# Patient Record
Sex: MALE | Race: WHITE | NOT HISPANIC OR LATINO | Employment: FULL TIME | URBAN - METROPOLITAN AREA
[De-identification: names, ages, dates, MRNs, and addresses within clinical notes are randomized per-mention and may not be internally consistent; named-entity substitution may affect disease eponyms.]

---

## 2024-08-20 ENCOUNTER — HOSPITAL ENCOUNTER (OUTPATIENT)
Dept: RADIOLOGY | Facility: CLINIC | Age: 34
Discharge: HOME | End: 2024-08-20
Payer: COMMERCIAL

## 2024-08-20 ENCOUNTER — APPOINTMENT (OUTPATIENT)
Dept: PRIMARY CARE | Facility: CLINIC | Age: 34
End: 2024-08-20

## 2024-08-20 VITALS
BODY MASS INDEX: 28.92 KG/M2 | SYSTOLIC BLOOD PRESSURE: 122 MMHG | HEIGHT: 70 IN | OXYGEN SATURATION: 97 % | HEART RATE: 64 BPM | DIASTOLIC BLOOD PRESSURE: 81 MMHG | WEIGHT: 202 LBS

## 2024-08-20 DIAGNOSIS — R06.02 SOB (SHORTNESS OF BREATH): Primary | ICD-10-CM

## 2024-08-20 DIAGNOSIS — R06.02 SOB (SHORTNESS OF BREATH): ICD-10-CM

## 2024-08-20 PROCEDURE — 71046 X-RAY EXAM CHEST 2 VIEWS: CPT

## 2024-08-20 PROCEDURE — 99203 OFFICE O/P NEW LOW 30 MIN: CPT | Performed by: INTERNAL MEDICINE

## 2024-08-20 PROCEDURE — 3008F BODY MASS INDEX DOCD: CPT | Performed by: INTERNAL MEDICINE

## 2024-08-20 PROCEDURE — 71046 X-RAY EXAM CHEST 2 VIEWS: CPT | Performed by: RADIOLOGY

## 2024-08-20 RX ORDER — ALBUTEROL SULFATE 90 UG/1
2 INHALANT RESPIRATORY (INHALATION) EVERY 4 HOURS PRN
Qty: 6.7 G | Refills: 1 | Status: SHIPPED | OUTPATIENT
Start: 2024-08-20 | End: 2025-08-20

## 2024-08-20 ASSESSMENT — ENCOUNTER SYMPTOMS
UNEXPECTED WEIGHT CHANGE: 0
SHORTNESS OF BREATH: 1
WHEEZING: 0
COUGH: 0

## 2024-08-20 NOTE — PROGRESS NOTES
"Subjective   Patient ID: Gallo Zarate is a 33 y.o. male who presents for Breathing Problem (New patient).    Here for SOB that started 5 months ago. Was sick when this started in March with an URTI. Is present at rest and only somewhat worsens with exertion. Doesn't get worse when laying down. In the morning will have an occasional wheeze. Doesn't have a cough, chest pain, unexpected weight loss, night sweats. Hasn't noticed that the cold makes symptoms worse. Did notice that sometimes being inside helps with symptoms. As a kid had asthma until he was 3 but nothing since.    Smoked for a total of 10 years with around a ppd, but quit 2 years ago. Does still vape and notices that this makes SOB worse. 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        Review of Systems   Constitutional:  Negative for unexpected weight change.   Respiratory:  Positive for shortness of breath. Negative for cough and wheezing.    Cardiovascular:  Negative for chest pain.       /81   Pulse 64   Ht 1.778 m (5' 10\")   Wt 91.6 kg (202 lb)   SpO2 97%   BMI 28.98 kg/m²   Objective   Physical Exam  Constitutional:       General: He is not in acute distress.     Appearance: He is not ill-appearing, toxic-appearing or diaphoretic.   HENT:      Head: Normocephalic and atraumatic.   Eyes:      Conjunctiva/sclera: Conjunctivae normal.   Cardiovascular:      Rate and Rhythm: Normal rate and regular rhythm.      Heart sounds: No murmur heard.     No friction rub. No gallop.   Pulmonary:      Effort: Pulmonary effort is normal. No respiratory distress.      Breath sounds: No stridor. No wheezing, rhonchi or rales.   Neurological:      Mental Status: He is alert.         Assessment/Plan   Problem List Items Addressed This Visit    None  Visit Diagnoses         Codes    SOB (shortness of breath)    -  Primary R06.02    Relevant Medications    albuterol (Proventil HFA) 90 " mcg/actuation inhaler    Other Relevant Orders    XR chest 2 views    Complete Pulmonary Function Test Pre/Post Bronchodialator (Spirometry Pre/Post/DLCO/Lung Volumes)    Methacholine Challenge (Bronchial Provocation)        -Will check CXR and PFTs to evaluate for asthma. To use albuterol as needed. All questions answered. To return in 2 months.         Pamella Rosa MD 08/20/24 2:21 PM

## 2024-09-09 ENCOUNTER — HOSPITAL ENCOUNTER (OUTPATIENT)
Dept: RESPIRATORY THERAPY | Facility: HOSPITAL | Age: 34
Discharge: HOME | End: 2024-09-09
Payer: COMMERCIAL

## 2024-09-09 DIAGNOSIS — R06.02 SOB (SHORTNESS OF BREATH): ICD-10-CM

## 2024-09-09 PROCEDURE — 94729 DIFFUSING CAPACITY: CPT

## 2024-09-09 PROCEDURE — 2500000004 HC RX 250 GENERAL PHARMACY W/ HCPCS (ALT 636 FOR OP/ED)

## 2024-09-10 ENCOUNTER — HOSPITAL ENCOUNTER (OUTPATIENT)
Dept: RESPIRATORY THERAPY | Facility: HOSPITAL | Age: 34
Discharge: HOME | End: 2024-09-10
Payer: COMMERCIAL

## 2024-09-10 DIAGNOSIS — R06.02 SOB (SHORTNESS OF BREATH): ICD-10-CM

## 2024-09-10 PROCEDURE — 2500000004 HC RX 250 GENERAL PHARMACY W/ HCPCS (ALT 636 FOR OP/ED)

## 2024-09-10 PROCEDURE — 95070 INHLJ BRNCL CHALLENGE TSTG: CPT

## 2024-09-11 LAB
MGC ASCENT PFT - FEV1 - POST: 4.36
MGC ASCENT PFT - FEV1 - PRE: 4.46
MGC ASCENT PFT - FEV1 - PRE: 4.48
MGC ASCENT PFT - FEV1 - PREDICTED: 4.11
MGC ASCENT PFT - FEV1 - PREDICTED: 4.11
MGC ASCENT PFT - FVC - POST: 5.67
MGC ASCENT PFT - FVC - PRE: 5.65
MGC ASCENT PFT - FVC - PRE: 5.72
MGC ASCENT PFT - FVC - PREDICTED: 5.01
MGC ASCENT PFT - FVC - PREDICTED: 5.01

## 2024-09-12 ENCOUNTER — TELEPHONE (OUTPATIENT)
Dept: PRIMARY CARE | Facility: CLINIC | Age: 34
End: 2024-09-12
Payer: COMMERCIAL

## 2024-09-12 NOTE — TELEPHONE ENCOUNTER
----- Message from Tiffanie Boswell sent at 9/12/2024  8:39 AM EDT -----  Let pt know his lung function tests were normal; no asthma noted or other respiratory conditions at this time

## 2024-09-20 ENCOUNTER — HOSPITAL ENCOUNTER (OUTPATIENT)
Dept: RESPIRATORY THERAPY | Facility: HOSPITAL | Age: 34
End: 2024-09-20
Payer: COMMERCIAL

## 2024-10-15 ENCOUNTER — APPOINTMENT (OUTPATIENT)
Dept: PRIMARY CARE | Facility: CLINIC | Age: 34
End: 2024-10-15
Payer: COMMERCIAL

## 2024-10-15 VITALS
SYSTOLIC BLOOD PRESSURE: 129 MMHG | BODY MASS INDEX: 28.45 KG/M2 | HEART RATE: 69 BPM | WEIGHT: 198.3 LBS | OXYGEN SATURATION: 96 % | DIASTOLIC BLOOD PRESSURE: 84 MMHG

## 2024-10-15 DIAGNOSIS — H53.9 VISUAL DISTURBANCES: ICD-10-CM

## 2024-10-15 DIAGNOSIS — E78.00 ELEVATED LDL CHOLESTEROL LEVEL: ICD-10-CM

## 2024-10-15 DIAGNOSIS — Z00.00 ROUTINE GENERAL MEDICAL EXAMINATION AT A HEALTH CARE FACILITY: ICD-10-CM

## 2024-10-15 DIAGNOSIS — R06.02 SOB (SHORTNESS OF BREATH): Primary | ICD-10-CM

## 2024-10-15 PROCEDURE — 1036F TOBACCO NON-USER: CPT | Performed by: INTERNAL MEDICINE

## 2024-10-15 PROCEDURE — 99213 OFFICE O/P EST LOW 20 MIN: CPT | Performed by: INTERNAL MEDICINE

## 2024-10-15 NOTE — PROGRESS NOTES
Subjective   Patient ID: Gallo Zarate is a 33 y.o. male who presents for Follow-up.    Pt feels like SOB has mildly improved since last appt. Did notice that changing his vape resulted in slight improvement of symptoms. Hasn't needed to try using albuterol.    Sometimes notices that his vision feels like he is 'drunk' despite not feeling dizzy or having had an alcoholic beverage. Started noticing this a couple years ago. Will happen a couple times a year and during the times that it occurs it will last off/on for a week at a time.        Review of Systems   Eyes:  Positive for visual disturbance.       /84   Pulse 69   Wt 89.9 kg (198 lb 4.8 oz)   SpO2 96%   BMI 28.45 kg/m²   Objective   Physical Exam  Constitutional:       General: He is not in acute distress.     Appearance: He is not ill-appearing, toxic-appearing or diaphoretic.   HENT:      Head: Normocephalic and atraumatic.   Eyes:      Conjunctiva/sclera: Conjunctivae normal.   Neck:      Vascular: No carotid bruit.   Neurological:      Mental Status: He is alert.         Assessment/Plan   Problem List Items Addressed This Visit             ICD-10-CM    SOB (shortness of breath) - Primary R06.02     -Biggest concern is that this is from vaping, however CXR/PFTs negative for asthma. Will refer to pulmonology given continued symptoms to see if further workup indicated. Pt agreeable with plan.         Relevant Orders    Referral to Pulmonology    Visual disturbances H53.9     -Has stretches twice a year where his vision is off like he is 'drunk'. BP has never been high and pt doesn't have a carotid bruit on exam. Will refer to ophthalmology to see what could be causing this.         Relevant Orders    Referral to Ophthalmology    Elevated LDL cholesterol level E78.00     -Seen in the past. Will repeat labwork.         Relevant Orders    Lipid panel     Other Visit Diagnoses         Codes    Routine general medical examination at a [facility]  facility     Z00.00    Relevant Orders    Comprehensive metabolic panel    CBC    Tsh With Reflex To Free T4 If Abnormal    Hemoglobin A1c        -Labwork as above.  -Pt declined flu shot. To let us know if he changes his mind.         Pamella Rosa MD 10/15/24 4:30 PM

## 2024-10-15 NOTE — ASSESSMENT & PLAN NOTE
-Biggest concern is that this is from vaping, however CXR/PFTs negative for asthma. Will refer to pulmonology given continued symptoms to see if further workup indicated. Pt agreeable with plan.

## 2024-10-15 NOTE — ASSESSMENT & PLAN NOTE
-Has stretches twice a year where his vision is off like he is 'drunk'. BP has never been high and pt doesn't have a carotid bruit on exam. Will refer to ophthalmology to see what could be causing this.

## 2024-12-30 PROBLEM — M79.672 CHRONIC PAIN IN LEFT FOOT: Status: ACTIVE | Noted: 2024-12-30

## 2024-12-30 PROBLEM — G89.29 CHRONIC PAIN IN LEFT FOOT: Status: ACTIVE | Noted: 2024-12-30

## 2024-12-30 PROBLEM — E66.3 OVERWEIGHT WITH BODY MASS INDEX (BMI) 25.0-29.9: Status: ACTIVE | Noted: 2024-12-30

## 2024-12-30 PROBLEM — R00.2 PALPITATIONS: Status: ACTIVE | Noted: 2024-12-30

## 2024-12-30 PROBLEM — I49.3 VENTRICULAR PREMATURE BEATS: Status: ACTIVE | Noted: 2024-12-30

## 2024-12-30 PROBLEM — J45.909 ASTHMA: Status: ACTIVE | Noted: 2024-12-30

## 2024-12-30 PROBLEM — L30.9 ECZEMA: Status: ACTIVE | Noted: 2024-12-30

## 2025-01-01 NOTE — PROGRESS NOTES
Patient: Gallo Zarate    [id number]  : 1990 -- AGE 34 y.o.    Provider: Alonzo Pickard MD     Location [facility]   Service Date: 2025          [facility]  New Visit Note    I was asked by Pamella Rosa MD to evaluate Gallo Zarate for dyspnea.     I independently reviewed available notes and outside records pertinent to today's visit. I also independently reviewed and analyzed the available laboratory results, imaging studies, and other diagnostic tests in the context of the patient’s clinical presentation as documented below.    History of Present Illness   Background:  Gallo Zarate is a 34 y.o. male presenting with dyspnea.    Today's (2025) HPI:   Patient reports symptoms since 2024.  Overall, symptoms improved since onset but reports dyspnea both at rest and with exertion, symptoms not significantly changed with exertion, present most of the day.  He cannot identify any exacerbating or relieving factors.  He denies associated cough, fever/chills, chest pain or discomfort.  Patient reports history of asthma as a young child, has not used inhalers for many years, is prescribed albuterol inhaler currently but has not used for the symptoms.  Underwent testing including PFTs which were normal and bronchoprovocation testing which was normal.  Of note, patient has history of PACs/PVCs noted on event monitor in , denies current symptoms and attributes previous episodes to stress and anxiety related to death in the family.    Review of Systems:  Review of Systems   Constitutional:  Negative for chills, fatigue, fever and unexpected weight change.   HENT:  Negative for congestion.    Respiratory:  Positive for shortness of breath. Negative for apnea, cough and wheezing.    Cardiovascular:  Negative for chest pain, palpitations and leg swelling.   Psychiatric/Behavioral:  Negative for sleep disturbance.    All other systems reviewed and  "are negative.      Past Medical History   He has no past medical history on file.    Immunizations     Immunization History   Administered Date(s) Administered    MMR vaccine, subcutaneous (MMR II) 07/29/2002    Tdap vaccine, age 7 year and older (BOOSTRIX, ADACEL) 03/10/2017       Medications and Allergies   Medications:  Current Outpatient Medications   Medication Instructions    albuterol (Proventil HFA) 90 mcg/actuation inhaler 2 puffs, inhalation, Every 4 hours PRN        Allergies:  Amoxicillin, Cefaclor, and Erythromycin    Social History   He reports that he quit smoking about 3 years ago. His smoking use included cigarettes. He started smoking about 13 years ago. He has a 10 pack-year smoking history. He uses smokeless tobacco. No history on file for alcohol use and drug use.    Smoking History: former 10-year tobacco smoker, current e-cigarette usage daily  Exposure/Job History: Unremarkable    Family History   No family history on file.    Physical Exam   VITAL SIGNS: /78   Pulse 71   Resp 16   Ht 1.778 m (5' 10\")   Wt 90.7 kg (200 lb)   SpO2 97%   BMI 28.70 kg/m²      Physical Exam  Vitals reviewed.   Constitutional:       General: He is awake. He is not in acute distress.     Appearance: Normal appearance. He is well-developed.   HENT:      Head: Normocephalic and atraumatic.      Right Ear: External ear normal.      Left Ear: External ear normal.      Nose: Nose normal.   Eyes:      General: No scleral icterus.     Extraocular Movements: Extraocular movements intact.      Conjunctiva/sclera: Conjunctivae normal.   Cardiovascular:      Rate and Rhythm: Normal rate and regular rhythm.      Heart sounds: Normal heart sounds.   Pulmonary:      Effort: Pulmonary effort is normal.      Breath sounds: Normal breath sounds.   Abdominal:      General: Abdomen is flat.   Musculoskeletal:         General: Normal range of motion.      Cervical back: Normal range of motion and neck supple.      Right " "lower leg: No edema.      Left lower leg: No edema.   Skin:     General: Skin is warm and dry.   Neurological:      General: No focal deficit present.      Mental Status: He is alert. Mental status is at baseline.   Psychiatric:         Behavior: Behavior normal.         Thought Content: Thought content normal.         Pulmonary Function Test Results     9/2024 - Normal PFTs, normal MCT with (-)BHR    Chest Imaging     XR chest 2 views 08/20/2024    Narrative  Interpreted By:  Ben Moreira,  STUDY:  XR CHEST 2 VIEWS;  8/20/2024 2:32 pm    INDICATION:  Signs/Symptoms:SOB.    COMPARISON:  03/24/2022    ACCESSION NUMBER(S):  [id number]    ORDERING CLINICIAN:  NATAN NEUMANN    FINDINGS:          CARDIOMEDIASTINAL SILHOUETTE:  Cardiomediastinal silhouette is normal in size and configuration.    LUNGS:  There are no focal areas of consolidation or pleural effusions noted.    ABDOMEN:  No remarkable upper abdominal findings.    BONES:  No acute osseous changes.    Impression  1.  No evidence of acute cardiopulmonary process.        MACRO:  None    Signed by: Ben Moreira 8/21/2024 6:21 AM  Dictation workstation:   [id number]      Echocardiogram     None    Labs/Cultures     Lab Results   Component Value Date    WBC 5.3 01/02/2025    HGB 15.4 01/02/2025    HCT 44.8 01/02/2025    MCV 85 01/02/2025     01/02/2025       Lab Results   Component Value Date    CREATININE 0.98 03/24/2022    BUN 18 03/24/2022     03/24/2022    K 3.7 03/24/2022     03/24/2022    CO2 26 03/24/2022        No results found for: \"OXANA\", \"RF\", \"SEDRATE\"     IgE: No results found for: \"IGE\"     AEC:   Eosinophils Absolute (x10*3/uL)   Date Value   01/02/2025 0.05     Eosinophils % (%)   Date Value   01/02/2025 0.9       Assessment and Plan     Gallo Zarate is a 34 y.o. male presenting for evaluation of dyspnea.    Problem List Items Addressed This Visit       SOB (shortness of breath) - Primary    Current Assessment & Plan     " Etiology unclear, PFTs and MCT normal, CXR normal.  Still may be subclinical asthma given history.  E-cigarette usage possibly contributing as an irritant.  Patient will discontinue all types of smoking, will use albuterol as needed as therapeutic challenge.  Given history of PACs/PVCs, will order TTE to evaluate for cardiomyopathy or other valvular heart disease though cardiac exam today normal.  Discussed next steps if continued bothersome symptoms including CPET, will assess at next visit.         Relevant Orders    Transthoracic Echo (TTE) Complete    Respiratory Allergy Profile IgE    CBC and Auto Differential (Completed)    Follow Up In Pulmonology    Ventricular premature beats    Relevant Orders    Transthoracic Echo (TTE) Complete    Respiratory Allergy Profile IgE    CBC and Auto Differential (Completed)    Follow Up In Pulmonology    Asthma    Relevant Orders    Transthoracic Echo (TTE) Complete    Respiratory Allergy Profile IgE    CBC and Auto Differential (Completed)    Follow Up In Pulmonology    Overweight with body mass index (BMI) 25.0-29.9    Relevant Orders    Transthoracic Echo (TTE) Complete    Respiratory Allergy Profile IgE    CBC and Auto Differential (Completed)    Follow Up In Pulmonology      Follow-up: 3 months    Alonzo Pickard MD   of Medicine, [facility] [employer]  [facility] Division of Pulmonary, Critical Care and Sleep Medicine  9:25 AM  01/02/25

## 2025-01-02 ENCOUNTER — LAB (OUTPATIENT)
Dept: LAB | Facility: LAB | Age: 35
End: 2025-01-02
Payer: COMMERCIAL

## 2025-01-02 ENCOUNTER — OFFICE VISIT (OUTPATIENT)
Dept: PULMONOLOGY | Facility: CLINIC | Age: 35
End: 2025-01-02
Payer: COMMERCIAL

## 2025-01-02 VITALS
SYSTOLIC BLOOD PRESSURE: 114 MMHG | OXYGEN SATURATION: 97 % | DIASTOLIC BLOOD PRESSURE: 78 MMHG | HEIGHT: 70 IN | BODY MASS INDEX: 28.63 KG/M2 | WEIGHT: 200 LBS | RESPIRATION RATE: 16 BRPM | HEART RATE: 71 BPM

## 2025-01-02 DIAGNOSIS — Z00.00 ROUTINE GENERAL MEDICAL EXAMINATION AT A HEALTH CARE FACILITY: ICD-10-CM

## 2025-01-02 DIAGNOSIS — R06.02 SOB (SHORTNESS OF BREATH): Primary | ICD-10-CM

## 2025-01-02 DIAGNOSIS — I49.3 VENTRICULAR PREMATURE BEATS: ICD-10-CM

## 2025-01-02 DIAGNOSIS — E66.3 OVERWEIGHT WITH BODY MASS INDEX (BMI) 25.0-29.9: ICD-10-CM

## 2025-01-02 DIAGNOSIS — E78.00 ELEVATED LDL CHOLESTEROL LEVEL: ICD-10-CM

## 2025-01-02 DIAGNOSIS — J45.20 MILD INTERMITTENT ASTHMA WITHOUT COMPLICATION (HHS-HCC): ICD-10-CM

## 2025-01-02 DIAGNOSIS — R06.02 SOB (SHORTNESS OF BREATH): ICD-10-CM

## 2025-01-02 LAB
ALBUMIN SERPL BCP-MCNC: 4.6 G/DL (ref 3.4–5)
ALP SERPL-CCNC: 50 U/L (ref 33–120)
ALT SERPL W P-5'-P-CCNC: 55 U/L (ref 10–52)
ANION GAP SERPL CALC-SCNC: 12 MMOL/L (ref 10–20)
AST SERPL W P-5'-P-CCNC: 33 U/L (ref 9–39)
BASOPHILS # BLD AUTO: 0.03 X10*3/UL (ref 0–0.1)
BASOPHILS NFR BLD AUTO: 0.6 %
BILIRUB SERPL-MCNC: 0.7 MG/DL (ref 0–1.2)
BUN SERPL-MCNC: 17 MG/DL (ref 6–23)
CALCIUM SERPL-MCNC: 9.1 MG/DL (ref 8.6–10.3)
CHLORIDE SERPL-SCNC: 103 MMOL/L (ref 98–107)
CHOLEST SERPL-MCNC: 185 MG/DL (ref 0–199)
CHOLESTEROL/HDL RATIO: 5.9
CO2 SERPL-SCNC: 28 MMOL/L (ref 21–32)
CREAT SERPL-MCNC: 1.12 MG/DL (ref 0.5–1.3)
EGFRCR SERPLBLD CKD-EPI 2021: 88 ML/MIN/1.73M*2
EOSINOPHIL # BLD AUTO: 0.05 X10*3/UL (ref 0–0.7)
EOSINOPHIL NFR BLD AUTO: 0.9 %
ERYTHROCYTE [DISTWIDTH] IN BLOOD BY AUTOMATED COUNT: 12.3 % (ref 11.5–14.5)
EST. AVERAGE GLUCOSE BLD GHB EST-MCNC: 94 MG/DL
GLUCOSE SERPL-MCNC: 97 MG/DL (ref 74–99)
HBA1C MFR BLD: 4.9 %
HCT VFR BLD AUTO: 44.8 % (ref 41–52)
HDLC SERPL-MCNC: 31.5 MG/DL
HGB BLD-MCNC: 15.4 G/DL (ref 13.5–17.5)
IMM GRANULOCYTES # BLD AUTO: 0.02 X10*3/UL (ref 0–0.7)
IMM GRANULOCYTES NFR BLD AUTO: 0.4 % (ref 0–0.9)
LDLC SERPL CALC-MCNC: 128 MG/DL
LYMPHOCYTES # BLD AUTO: 1.45 X10*3/UL (ref 1.2–4.8)
LYMPHOCYTES NFR BLD AUTO: 27.4 %
MCH RBC QN AUTO: 29.1 PG (ref 26–34)
MCHC RBC AUTO-ENTMCNC: 34.4 G/DL (ref 32–36)
MCV RBC AUTO: 85 FL (ref 80–100)
MONOCYTES # BLD AUTO: 0.81 X10*3/UL (ref 0.1–1)
MONOCYTES NFR BLD AUTO: 15.3 %
NEUTROPHILS # BLD AUTO: 2.94 X10*3/UL (ref 1.2–7.7)
NEUTROPHILS NFR BLD AUTO: 55.4 %
NON HDL CHOLESTEROL: 154 MG/DL (ref 0–149)
NRBC BLD-RTO: 0 /100 WBCS (ref 0–0)
PLATELET # BLD AUTO: 172 X10*3/UL (ref 150–450)
POTASSIUM SERPL-SCNC: 4 MMOL/L (ref 3.5–5.3)
PROT SERPL-MCNC: 7.3 G/DL (ref 6.4–8.2)
RBC # BLD AUTO: 5.3 X10*6/UL (ref 4.5–5.9)
SODIUM SERPL-SCNC: 139 MMOL/L (ref 136–145)
TRIGL SERPL-MCNC: 127 MG/DL (ref 0–149)
TSH SERPL-ACNC: 2.14 MIU/L (ref 0.44–3.98)
VLDL: 25 MG/DL (ref 0–40)
WBC # BLD AUTO: 5.3 X10*3/UL (ref 4.4–11.3)

## 2025-01-02 PROCEDURE — 83036 HEMOGLOBIN GLYCOSYLATED A1C: CPT

## 2025-01-02 PROCEDURE — 84443 ASSAY THYROID STIM HORMONE: CPT

## 2025-01-02 PROCEDURE — 99214 OFFICE O/P EST MOD 30 MIN: CPT | Performed by: HOSPITALIST

## 2025-01-02 PROCEDURE — 80053 COMPREHEN METABOLIC PANEL: CPT

## 2025-01-02 PROCEDURE — 80061 LIPID PANEL: CPT

## 2025-01-02 PROCEDURE — 99204 OFFICE O/P NEW MOD 45 MIN: CPT | Performed by: HOSPITALIST

## 2025-01-02 PROCEDURE — 3008F BODY MASS INDEX DOCD: CPT | Performed by: HOSPITALIST

## 2025-01-02 PROCEDURE — 85025 COMPLETE CBC W/AUTO DIFF WBC: CPT

## 2025-01-02 ASSESSMENT — ENCOUNTER SYMPTOMS
WHEEZING: 0
CHILLS: 0
APNEA: 0
FEVER: 0
PALPITATIONS: 0
SHORTNESS OF BREATH: 1
FATIGUE: 0
COUGH: 0
SLEEP DISTURBANCE: 0
UNEXPECTED WEIGHT CHANGE: 0

## 2025-01-02 ASSESSMENT — PAIN SCALES - GENERAL: PAINLEVEL_OUTOF10: 0-NO PAIN

## 2025-01-02 NOTE — PATIENT INSTRUCTIONS
[facility] Pulmonary Medicine  [facility]  [facility]  [address]  [place] [address]       NAME: Gallo Zarate   DATE: 1/2/2025     Your Pulmonary Physician Today: Alonzo Pickard MD  Your Primary Care Provider: Pamella Rosa MD   Your Referring Provider: Pamella Rosa MD    DIAGNOSIS:  1. SOB (shortness of breath)  Transthoracic Echo (TTE) Complete    perflutren lipid microspheres (Definity) injection 0.5-10 mL of dilution    sulfur hexafluoride microsphr (Lumason) injection 24.28 mg    perflutren protein A microsphere (Optison) injection 0.5 mL    Insert and maintain peripheral IV    Referral to Pulmonology    Respiratory Allergy Profile IgE    CBC and Auto Differential      2. Ventricular premature beats  Transthoracic Echo (TTE) Complete    perflutren lipid microspheres (Definity) injection 0.5-10 mL of dilution    sulfur hexafluoride microsphr (Lumason) injection 24.28 mg    perflutren protein A microsphere (Optison) injection 0.5 mL    Insert and maintain peripheral IV    Respiratory Allergy Profile IgE    CBC and Auto Differential      3. Overweight with body mass index (BMI) 25.0-29.9  Transthoracic Echo (TTE) Complete    perflutren lipid microspheres (Definity) injection 0.5-10 mL of dilution    sulfur hexafluoride microsphr (Lumason) injection 24.28 mg    perflutren protein A microsphere (Optison) injection 0.5 mL    Insert and maintain peripheral IV    Respiratory Allergy Profile IgE    CBC and Auto Differential      4. Mild intermittent asthma without complication (HHS-HCC)  Transthoracic Echo (TTE) Complete    perflutren lipid microspheres (Definity) injection 0.5-10 mL of dilution    sulfur hexafluoride microsphr (Lumason) injection 24.28 mg    perflutren protein A microsphere (Optison) injection 0.5 mL    Insert and maintain peripheral IV    Respiratory Allergy Profile IgE    CBC and Auto Differential        Thank you for coming to the Pulmonary  Medicine Clinic today! Below is a summary of your treatment plan, other important information, and our contact numbers:    TREATMENT PLAN     We discussed the followin.) Your lung tests so fare are normal, but mild asthma could still be present and causing symptoms.  2.) We will get blood tests evaluating asthma and an echocardiogram (ultrasound of the heart) to see why you are getting short of breath.  3.) Regardless of the results, I agree with stopping smoking/vaping entirely.    Tests that need to be scheduled:  1.) Blood tests  2.) Echocardiogram    Medications/inhalers prescribed: none    Follow-up Appointment: 3 months    IMPORTANT INFORMATION     Call 911 for medical emergencies.  Our offices are generally open from Monday-Friday, 9 am - 5 pm.  If you need to get in touch with me, you may either call me and my team(number is below) or you can use [employer].    IMPORTANT PHONE NUMBERS (FOR SCHEDULING/QUESTIONS)     Pulmonary Department Main Line: [phone]   Pulmonary Nurse (Kerry Vasquez RN): [phone]    For scheduling purposes:  Breathing (pulmonary function) or a walking test: [phone]   EKG's, Echocardiograms and Cardiopulmonary Stress Tests: [phone]   Radiology tests such as Nuclear Medicine, CT Scans, and MRI's: [phone]  Pulmonary clinic follow-up: [phone]      For sleep studies, Our team will contact you, however, you can also contact us as follows:  Main Phone Line (scheduling only): [phone]), option 3  Adult and Pediatric Locations  [facility] (6 years and older): [facility] - 4th floor ([address] [place] [address]) After hours line: [phone]  [facility] (Main campus: All ages): [facility], 6th floor. After hours line: [phone]   [place] (5 years and older; younger considered on case-by-case basis): [address] [facility]; [employer] [address], [address]. Scheduling  After hours line:  "[phone]   [place] (6 years and older): [address]; [address]; [address]   [place] (6 years and older): [address], [address]  After hours line: [phone]   [place] (13 years and older) in [place]: [address], 2nd floor  After hours line: [phone]   [place] (13 year and older): [address]  After hours line: [phone]     Adult Only Locations:   [place] (18 years and older): [address] [facility], 2nd floor   [place] (18 years and older): [address] [facility]; 4th floor  After hours line: [phone]   [place] (18 years and older) at [place]: [id number] [place] [address]  After hours line: [phone]     OUR ADULT PULMONARY MEDICINE TEAM   Please do not hesitate to call the office or sleep nurse with any questions between appointments:    Adult Pulmonary Nurse (Kerry Vasquez RN):  For questions about your diagnosis, care plan and refilling prescriptions: [phone]    Adult Pulmonary Medicine  (Kaley Schultz):  Please contact for scheduling issues: P: [phone]  F: [phone]    CONTACTING YOUR PULMONARY PHYSICIAN     Send a message directly to your physician through \"My Chart\", which is the email service through your  Records Account: [email or web address]   Call [phone] and leave a message. One of the administrative assistants will forward the message to your physician through \"My Chart\" and/or email.     PRESCRIPTIONS     We require 7 days advanced notice for prescription refills. If we do not receive the request in this time, we cannot guarantee that your medication will be refilled in time.    Alonzo Pickard MD   of Medicine, [facility] [employer]  [facility] Division of Pulmonary, Critical Care and Sleep Medicine  "

## 2025-01-02 NOTE — Clinical Note
Hello,  I was happy to see your patient today in pulmonary medicine clinic for consultation. Attached is my note for your review but, in summary:  1.) Unclear etiology of dyspnea but getting TTE given history of PVCs to rule-out heart failure or valvular heart disease. 2.) He has quit vaping. 3.) Will consider CPET at next visit if other testing unrevealing and albuterol not helping (he hasn't tried it yet).  Please don't hesitate to reach out to me if there are any further questions.  Thank you for the referral.  Regards, Wilbur Pickard

## 2025-01-02 NOTE — ASSESSMENT & PLAN NOTE
Etiology unclear, PFTs and MCT normal, CXR normal.  Still may be subclinical asthma given history.  E-cigarette usage possibly contributing as an irritant.  Patient will discontinue all types of smoking, will use albuterol as needed as therapeutic challenge.  Given history of PACs/PVCs, will order TTE to evaluate for cardiomyopathy or other valvular heart disease though cardiac exam today normal.  Discussed next steps if continued bothersome symptoms including CPET, will assess at next visit.

## 2025-01-07 ENCOUNTER — HOSPITAL ENCOUNTER (OUTPATIENT)
Dept: CARDIOLOGY | Facility: CLINIC | Age: 35
Discharge: HOME | End: 2025-01-07
Payer: COMMERCIAL

## 2025-01-07 DIAGNOSIS — E66.3 OVERWEIGHT WITH BODY MASS INDEX (BMI) 25.0-29.9: ICD-10-CM

## 2025-01-07 DIAGNOSIS — I49.3 VENTRICULAR PREMATURE BEATS: ICD-10-CM

## 2025-01-07 DIAGNOSIS — J45.20 MILD INTERMITTENT ASTHMA WITHOUT COMPLICATION (HHS-HCC): ICD-10-CM

## 2025-01-07 DIAGNOSIS — R06.02 SOB (SHORTNESS OF BREATH): ICD-10-CM

## 2025-01-07 LAB
AORTIC VALVE MEAN GRADIENT: 3 MMHG
AORTIC VALVE PEAK VELOCITY: 1.27 M/S
AV PEAK GRADIENT: 6 MMHG
AVA (PEAK VEL): 2.95 CM2
AVA (VTI): 2.95 CM2
EJECTION FRACTION APICAL 4 CHAMBER: 59.2
EJECTION FRACTION: 56 %
LEFT ATRIUM VOLUME AREA LENGTH INDEX BSA: 20.7 ML/M2
LEFT VENTRICLE INTERNAL DIMENSION DIASTOLE: 4.75 CM (ref 3.5–6)
LEFT VENTRICULAR OUTFLOW TRACT DIAMETER: 2.25 CM
MITRAL VALVE E/A RATIO: 1.53
RIGHT VENTRICLE FREE WALL PEAK S': 0.1 CM/S
RIGHT VENTRICLE PEAK SYSTOLIC PRESSURE: 28.7 MMHG
TRICUSPID ANNULAR PLANE SYSTOLIC EXCURSION: 2.1 CM

## 2025-01-07 PROCEDURE — 93306 TTE W/DOPPLER COMPLETE: CPT

## 2025-01-07 PROCEDURE — 93306 TTE W/DOPPLER COMPLETE: CPT | Performed by: STUDENT IN AN ORGANIZED HEALTH CARE EDUCATION/TRAINING PROGRAM

## 2025-01-20 ENCOUNTER — APPOINTMENT (OUTPATIENT)
Dept: PRIMARY CARE | Facility: CLINIC | Age: 35
End: 2025-01-20
Payer: COMMERCIAL

## 2025-01-20 VITALS
OXYGEN SATURATION: 95 % | DIASTOLIC BLOOD PRESSURE: 69 MMHG | SYSTOLIC BLOOD PRESSURE: 110 MMHG | BODY MASS INDEX: 28.63 KG/M2 | HEIGHT: 70 IN | RESPIRATION RATE: 14 BRPM | HEART RATE: 62 BPM | WEIGHT: 200 LBS

## 2025-01-20 DIAGNOSIS — Z00.00 ANNUAL PHYSICAL EXAM: Primary | ICD-10-CM

## 2025-01-20 DIAGNOSIS — E78.00 ELEVATED LDL CHOLESTEROL LEVEL: ICD-10-CM

## 2025-01-20 PROCEDURE — 99395 PREV VISIT EST AGE 18-39: CPT | Performed by: INTERNAL MEDICINE

## 2025-01-20 PROCEDURE — 3008F BODY MASS INDEX DOCD: CPT | Performed by: INTERNAL MEDICINE

## 2025-01-20 ASSESSMENT — ENCOUNTER SYMPTOMS
COUGH: 0
DIZZINESS: 0
WHEEZING: 0
CONSTIPATION: 0
HEADACHES: 0
SHORTNESS OF BREATH: 1
SLEEP DISTURBANCE: 0
CHEST TIGHTNESS: 0
BLOOD IN STOOL: 0
FATIGUE: 0

## 2025-01-20 NOTE — PROGRESS NOTES
"Subjective   Patient ID: Gallo Zarate is a 34 y.o. male who presents for Follow-up.    Pt here for a physical.    Stopped vaping 2 weeks ago. Saw pulmonology and had a TTE done that was negative.        Review of Systems   Constitutional:  Negative for fatigue.   Respiratory:  Positive for shortness of breath. Negative for cough, chest tightness and wheezing.    Cardiovascular:  Negative for chest pain.   Gastrointestinal:  Negative for blood in stool and constipation.   Neurological:  Negative for dizziness and headaches.   Psychiatric/Behavioral:  Negative for sleep disturbance.        /69 (BP Location: Right arm, Patient Position: Sitting)   Pulse 62   Resp 14   Ht 1.784 m (5' 10.25\")   Wt 90.7 kg (200 lb)   SpO2 95%   BMI 28.49 kg/m²   Objective   Physical Exam  Constitutional:       General: He is not in acute distress.     Appearance: He is not ill-appearing, toxic-appearing or diaphoretic.   HENT:      Head: Normocephalic and atraumatic.   Eyes:      General: No scleral icterus.     Conjunctiva/sclera: Conjunctivae normal.   Cardiovascular:      Rate and Rhythm: Normal rate and regular rhythm.      Heart sounds: No murmur heard.     No friction rub. No gallop.   Pulmonary:      Effort: Pulmonary effort is normal. No respiratory distress.      Breath sounds: No stridor. No wheezing, rhonchi or rales.   Abdominal:      General: Abdomen is flat. Bowel sounds are normal. There is no distension.      Palpations: Abdomen is soft.      Tenderness: There is no abdominal tenderness. There is no guarding.   Musculoskeletal:      Cervical back: Normal range of motion and neck supple. No tenderness.   Lymphadenopathy:      Cervical: No cervical adenopathy.   Skin:     General: Skin is warm and dry.   Neurological:      Mental Status: He is alert.         Assessment/Plan   Problem List Items Addressed This Visit             ICD-10-CM    Elevated LDL cholesterol level E78.00     -Reviewed LDL lvl w/ pt and " what this means.  -Counseled on dieting and exercise. Pt already rarely eats red meat. He does not exercise. Will follow up next visit to see how that has changed.          Other Visit Diagnoses         Codes    Annual physical exam    -  Primary Z00.00        -Pt declines flu shot today.         Pamella Rosa MD 01/20/25 4:40 PM

## 2025-01-20 NOTE — ASSESSMENT & PLAN NOTE
-Reviewed LDL lvl w/ pt and what this means.  -Counseled on dieting and exercise. Pt already rarely eats red meat. He does not exercise. Will follow up next visit to see how that has changed.

## 2025-04-30 ASSESSMENT — ENCOUNTER SYMPTOMS
UNEXPECTED WEIGHT CHANGE: 0
COUGH: 0
FEVER: 0
APNEA: 0
FATIGUE: 0
SLEEP DISTURBANCE: 0
CHILLS: 0
WHEEZING: 0
PALPITATIONS: 0

## 2025-04-30 NOTE — PROGRESS NOTES
Patient: Gallo Zarate    [id number]  : 1990 -- AGE 34 y.o.    Provider: Alonzo Pickard MD     [facility]   Service Date: 2025          [facility]  Follow-up Visit Note    I independently reviewed available notes and outside records pertinent to today's visit. I also independently reviewed and analyzed the available laboratory results, imaging studies, and other diagnostic tests in the context of the patient’s clinical presentation as documented below.    History of Present Illness   Background:  Gallo Zarate is a 34 y.o. male presenting with dyspnea.    Today's (2025) HPI:   Patient presents today with improved symptoms since last visit.  Dyspnea on exertion improved, now just present at times.  He tried the MAGDI inhaler a couple times for symptoms and did not notice much difference.  Currently is getting over a viral URI.  Denies significant coughing/wheezing.    Review of Systems:  Review of Systems   Constitutional:  Negative for chills, fatigue, fever and unexpected weight change.   HENT:  Negative for congestion.    Respiratory:  Positive for shortness of breath (improved). Negative for apnea, cough and wheezing.    Cardiovascular:  Negative for chest pain, palpitations and leg swelling.   Psychiatric/Behavioral:  Negative for sleep disturbance.    All other systems reviewed and are negative.      Past Medical History   He has no past medical history on file.    Immunizations     Immunization History   Administered Date(s) Administered    MMR vaccine, subcutaneous (MMR II) 2002    Tdap vaccine, age 7 year and older (BOOSTRIX, ADACEL) 03/10/2017       Medications and Allergies   Medications:  Current Outpatient Medications   Medication Instructions    albuterol (Proventil HFA) 90 mcg/actuation inhaler 2 puffs, inhalation, Every 4 hours PRN    budesonide-formoterol (Symbicort) 80-4.5 mcg/actuation inhaler 2 puffs, inhalation, 2  "times daily RT, Rinse mouth with water after use to reduce aftertaste and incidence of candidiasis. Do not swallow.        Allergies:  Amoxicillin, Cefaclor, and Erythromycin    Social History   He reports that he quit smoking about 3 years ago. His smoking use included cigarettes. He started smoking about 13 years ago. He has a 10 pack-year smoking history. He uses smokeless tobacco. He reports current alcohol use of about 6.0 standard drinks of alcohol per week. He reports current drug use.    Smoking History: former 10-year tobacco smoker, current e-cigarette usage daily  Exposure/Job History: Unremarkable    Family History     Family History   Problem Relation Name Age of Onset    Brain tumor (Multi) Mother      Lymphoma Father      Atrial fibrillation Father      Atrial fibrillation Father's Brother      Dementia Maternal Grandfather      Coronary artery disease Maternal Grandfather          s/p CABG    Throat cancer Paternal Grandfather          Smoker    Atrial fibrillation Paternal Grandfather         Physical Exam   VITAL SIGNS: /73   Pulse 59   Temp 35.7 °C (96.3 °F)   Resp 18   Ht 1.784 m (5' 10.25\")   Wt 90.7 kg (200 lb)   SpO2 98%   BMI 28.49 kg/m²      Physical Exam  Vitals reviewed.   Constitutional:       General: He is awake. He is not in acute distress.     Appearance: Normal appearance. He is well-developed.   HENT:      Head: Normocephalic and atraumatic.      Right Ear: External ear normal.      Left Ear: External ear normal.      Nose: Nose normal.   Eyes:      General: No scleral icterus.     Extraocular Movements: Extraocular movements intact.      Conjunctiva/sclera: Conjunctivae normal.   Cardiovascular:      Rate and Rhythm: Normal rate and regular rhythm.      Heart sounds: Normal heart sounds.   Pulmonary:      Effort: Pulmonary effort is normal.      Breath sounds: Normal breath sounds.   Abdominal:      General: Abdomen is flat.   Musculoskeletal:         General: Normal " "range of motion.      Cervical back: Normal range of motion and neck supple.      Right lower leg: No edema.      Left lower leg: No edema.   Skin:     General: Skin is warm and dry.   Neurological:      General: No focal deficit present.      Mental Status: He is alert. Mental status is at baseline.   Psychiatric:         Behavior: Behavior normal.         Thought Content: Thought content normal.         Pulmonary Function Test Results     9/2024 - Normal PFTs, normal MCT with (-)BHR    Chest Imaging     XR chest 2 views 08/20/2024    Narrative  Interpreted By:  Ben Moreira,  STUDY:  XR CHEST 2 VIEWS;  8/20/2024 2:32 pm    INDICATION:  Signs/Symptoms:SOB.    COMPARISON:  03/24/2022    ACCESSION NUMBER(S):  [id number]    ORDERING CLINICIAN:  NATAN NEUMANN    FINDINGS:          CARDIOMEDIASTINAL SILHOUETTE:  Cardiomediastinal silhouette is normal in size and configuration.    LUNGS:  There are no focal areas of consolidation or pleural effusions noted.    ABDOMEN:  No remarkable upper abdominal findings.    BONES:  No acute osseous changes.    Impression  1.  No evidence of acute cardiopulmonary process.        MACRO:  None    Signed by: Ben Moreira 8/21/2024 6:21 AM  Dictation workstation:   [id number]      Echocardiogram     TTE (1/2025)    CONCLUSIONS:   1. Left ventricular ejection fraction is normal, calculated by Uribe's biplane at 56%.   2. There is normal right ventricular global systolic function.   3. Right ventricular systolic pressure is within normal limits.    Labs/Cultures     Lab Results   Component Value Date    WBC 5.3 01/02/2025    HGB 15.4 01/02/2025    HCT 44.8 01/02/2025    MCV 85 01/02/2025     01/02/2025       Lab Results   Component Value Date    CREATININE 1.12 01/02/2025    BUN 17 01/02/2025     01/02/2025    K 4.0 01/02/2025     01/02/2025    CO2 28 01/02/2025        No results found for: \"OXANA\", \"RF\", \"SEDRATE\"     IgE: No results found for: \"IGE\"     AEC: "   Eosinophils Absolute (x10*3/uL)   Date Value   01/02/2025 0.05     Eosinophils % (%)   Date Value   01/02/2025 0.9       Assessment and Plan     Gallo Zarate is a 34 y.o. male presenting for evaluation of dyspnea.    Problem List Items Addressed This Visit       SOB (shortness of breath) - Primary    Current Assessment & Plan   Reviewed normal TTE with patient.  Overall improved symptoms with relatively benign workup so far.  Discussed treatment for potential reactive airways disease in the future with ICS/LABA.  Otherwise, we will continue to monitor symptoms closely.         Relevant Orders    Follow Up In Pulmonology    Ventricular premature beats    Relevant Orders    Follow Up In Pulmonology    Asthma    Current Assessment & Plan   Discussed PFTs and symptoms suggesting possible reactive airways disease.  Respiratory allergen IgE profile ordered, with help diagnostically to have that completed.  Order placed for ICS/LABA for patient to try in the future if symptoms return.         Relevant Medications    budesonide-formoterol (Symbicort) 80-4.5 mcg/actuation inhaler    Other Relevant Orders    Respiratory Allergy Profile IgE    Follow Up In Pulmonology    Overweight with body mass index (BMI) 25.0-29.9    Relevant Orders    Follow Up In Pulmonology     Follow-up: 6 months    Alonzo Pickard MD   of Medicine, [facility] [employer] [facility] [employer]  [facility] Division of Pulmonary, Critical Care and Sleep Medicine  8:59 AM  05/01/25

## 2025-05-01 ENCOUNTER — OFFICE VISIT (OUTPATIENT)
Dept: PULMONOLOGY | Facility: CLINIC | Age: 35
End: 2025-05-01
Payer: COMMERCIAL

## 2025-05-01 VITALS
HEART RATE: 59 BPM | RESPIRATION RATE: 18 BRPM | TEMPERATURE: 96.3 F | OXYGEN SATURATION: 98 % | SYSTOLIC BLOOD PRESSURE: 117 MMHG | BODY MASS INDEX: 28.63 KG/M2 | WEIGHT: 200 LBS | DIASTOLIC BLOOD PRESSURE: 73 MMHG | HEIGHT: 70 IN

## 2025-05-01 DIAGNOSIS — R06.02 SOB (SHORTNESS OF BREATH): Primary | ICD-10-CM

## 2025-05-01 DIAGNOSIS — J45.20 MILD INTERMITTENT ASTHMA WITHOUT COMPLICATION (HHS-HCC): ICD-10-CM

## 2025-05-01 DIAGNOSIS — I49.3 VENTRICULAR PREMATURE BEATS: ICD-10-CM

## 2025-05-01 DIAGNOSIS — E66.3 OVERWEIGHT WITH BODY MASS INDEX (BMI) 25.0-29.9: ICD-10-CM

## 2025-05-01 PROCEDURE — 99214 OFFICE O/P EST MOD 30 MIN: CPT | Performed by: HOSPITALIST

## 2025-05-01 PROCEDURE — 3008F BODY MASS INDEX DOCD: CPT | Performed by: HOSPITALIST

## 2025-05-01 RX ORDER — BUDESONIDE AND FORMOTEROL FUMARATE DIHYDRATE 80; 4.5 UG/1; UG/1
2 AEROSOL RESPIRATORY (INHALATION)
Qty: 10.2 G | Refills: 5 | Status: SHIPPED | OUTPATIENT
Start: 2025-05-01 | End: 2025-10-28

## 2025-05-01 RX ORDER — BUDESONIDE AND FORMOTEROL FUMARATE DIHYDRATE 80; 4.5 UG/1; UG/1
2 AEROSOL RESPIRATORY (INHALATION)
Qty: 10.2 G | Refills: 5 | Status: SHIPPED | OUTPATIENT
Start: 2025-05-01 | End: 2025-05-01

## 2025-05-01 ASSESSMENT — ENCOUNTER SYMPTOMS: SHORTNESS OF BREATH: 1

## 2025-05-01 ASSESSMENT — PAIN SCALES - GENERAL: PAINLEVEL_OUTOF10: 0-NO PAIN

## 2025-05-01 NOTE — ASSESSMENT & PLAN NOTE
Reviewed normal TTE with patient.  Overall improved symptoms with relatively benign workup so far.  Discussed treatment for potential reactive airways disease in the future with ICS/LABA.  Otherwise, we will continue to monitor symptoms closely.

## 2025-05-01 NOTE — PATIENT INSTRUCTIONS
[facility]  [facility]   [facility]  [address] [place] [address]       NAME: Gallo Zarate   DATE: 5/1/2025     Your Pulmonary Physician Today: Alonzo Pickard MD  Your Primary Care Provider: Pamella Rosa MD     DIAGNOSIS:  1. SOB (shortness of breath)  Follow Up In Pulmonology    Follow Up In Pulmonology      2. Mild intermittent asthma without complication (HHS-HCC)  Follow Up In Pulmonology    Respiratory Allergy Profile IgE    Respiratory Allergy Profile IgE    Follow Up In Pulmonology    budesonide-formoterol (Symbicort) 80-4.5 mcg/actuation inhaler    DISCONTINUED: budesonide-formoterol (Symbicort) 80-4.5 mcg/actuation inhaler      3. Ventricular premature beats  Follow Up In Pulmonology    Follow Up In Pulmonology      4. Overweight with body mass index (BMI) 25.0-29.9  Follow Up In Pulmonology    Follow Up In Pulmonology          Thank you for coming to the Pulmonary Medicine Clinic today! Below is a summary of your treatment plan, other important information, and our contact numbers:    TREATMENT PLAN     Medications/inhalers prescribed:  1.) Symbicort (inhaler to try if needed)    Follow-up Appointment: 6 months    IMPORTANT INFORMATION     Call 911 for medical emergencies.  Our offices are generally open from Monday-Friday, 9 am - 5 pm.  If you need to get in touch with me, you may either call me and my team(number is below) or you can use [employer].    IMPORTANT PHONE NUMBERS (FOR SCHEDULING/QUESTIONS)     Pulmonary Department Main Line: [phone]   Pulmonary Nurse (Kerry Vasquez RN): [phone]    For scheduling purposes:  Breathing (pulmonary function) or a walking test: [phone]   EKG's, Echocardiograms and Cardiopulmonary Stress Tests: [phone]   Radiology tests such as Nuclear Medicine, CT Scans, and MRI's: [phone]  Pulmonary clinic follow-up: [phone]      For sleep studies, Our team will contact you, however,  "you can also contact us as follows:  Main Phone Line (scheduling only): [phone] ([phone]), option 3  Adult and Pediatric Locations   [place] (6 years and older): [facility] - 4th floor ([address], [facility]) After hours line: [phone]  [facility] (Main campus: All ages): [facility], 6th floor. After hours line: [phone]   [place] (5 years and older; younger considered on case-by-case basis): [address]; [address], [address]. Scheduling  After hours line: [phone]   [place] (6 years and older): [address]; [address]; [address]   [place] (6 years and older): [address] [facility], [address]  After hours line: [phone]   [place] (13 years and older) in [place]: [address], 2nd floor  After hours line: [phone]   [place] (13 year and older): [address]  After hours line: [phone]     Adult Only Locations:   [place] (18 years and older): [address] [facility], 2nd floor   [place] (18 years and older): [address] [facility]; 4th floor  After hours line: [phone]   [place] (18 years and older) at [place]: [address] [place] [address]  After hours line: [phone]     OUR ADULT PULMONARY MEDICINE TEAM   Please do not hesitate to call the office or sleep nurse with any questions between appointments:    Adult Pulmonary Nurse (Kerry Vasquez, RN):  For questions about your diagnosis, care plan and refilling prescriptions: [phone]    Adult Pulmonary Medicine  (Kaley Schultz):  Please contact for scheduling issues: P: [phone]  F: [phone]    CONTACTING YOUR PULMONARY PHYSICIAN     Send a message directly to your physician through \"My Chart\", which is the email service through your  Records Account: [email or web address]   Call [phone] and leave a message. One of the administrative " "assistants will forward the message to your physician through \"My Chart\" and/or email.     PRESCRIPTIONS     We require 7 days advanced notice for prescription refills. If we do not receive the request in this time, we cannot guarantee that your medication will be refilled in time.    Alonzo Pickard MD   of Medicine, [facility]  [facility] Division of Pulmonary, Critical Care and Sleep Medicine   "

## 2025-05-01 NOTE — ASSESSMENT & PLAN NOTE
Discussed PFTs and symptoms suggesting possible reactive airways disease.  Respiratory allergen IgE profile ordered, with help diagnostically to have that completed.  Order placed for ICS/LABA for patient to try in the future if symptoms return.

## 2025-05-05 LAB
A ALTERNATA IGE QN: <0.1 KU/L
A ALTERNATA IGE RAST: 0
A FUMIGATUS IGE QN: <0.1 KU/L
A FUMIGATUS IGE RAST: 0
BERMUDA GRASS IGE QN: 0.17 KU/L
BERMUDA GRASS IGE RAST: ABNORMAL
BOXELDER IGE QN: <0.1 KU/L
BOXELDER IGE RAST: 0
C HERBARUM IGE QN: <0.1 KU/L
C HERBARUM IGE RAST: 0
CALIF WALNUT POLN IGE QN: <0.1 KU/L
CALIF WALNUT POLN IGE RAST: 0
CAT DANDER IGE QN: <0.1 KU/L
CAT DANDER IGE RAST: 0
CMN PIGWEED IGE QN: <0.1 KU/L
CMN PIGWEED IGE RAST: 0
COMMON RAGWEED IGE QN: <0.1 KU/L
COMMON RAGWEED IGE RAST: 0
COTTONWOOD IGE QN: <0.1 KU/L
COTTONWOOD IGE RAST: 0
D FARINAE IGE QN: 0.32 KU/L
D FARINAE IGE RAST: ABNORMAL
D PTERONYSS IGE QN: 0.35 KU/L
D PTERONYSS IGE RAST: 1
DOG DANDER IGE QN: <0.1 KU/L
DOG DANDER IGE RAST: 0
IGE SERPL-ACNC: 44 KU/L
LONDON PLANE IGE QN: <0.1 KU/L
LONDON PLANE IGE RAST: 0
MOUSE URINE PROT IGE QN: <0.1 KU/L
MOUSE URINE PROT IGE RAST: 0
MT JUNIPER IGE QN: <0.1 KU/L
MT JUNIPER IGE RAST: 0
P NOTATUM IGE QN: <0.1 KU/L
P NOTATUM IGE RAST: 0
PECAN/HICK TREE IGE QN: <0.1 KU/L
PECAN/HICK TREE IGE RAST: 0
REF LAB TEST REF RANGE: NORMAL
ROACH IGE QN: 0.15 KU/L
ROACH IGE RAST: ABNORMAL
SALTWORT IGE QN: <0.1 KU/L
SALTWORT IGE RAST: 0
SHEEP SORREL IGE QN: <0.1 KU/L
SHEEP SORREL IGE RAST: 0
SILVER BIRCH IGE QN: <0.1 KU/L
SILVER BIRCH IGE RAST: 0
TIMOTHY IGE QN: 0.81 KU/L
TIMOTHY IGE RAST: 2
WHITE ASH IGE QN: <0.1 KU/L
WHITE ASH IGE RAST: 0
WHITE ELM IGE QN: <0.1 KU/L
WHITE ELM IGE RAST: 0
WHITE MULBERRY IGE QN: <0.1 KU/L
WHITE MULBERRY IGE RAST: 0
WHITE OAK IGE QN: <0.1 KU/L
WHITE OAK IGE RAST: 0

## 2025-11-06 ENCOUNTER — APPOINTMENT (OUTPATIENT)
Dept: PULMONOLOGY | Facility: CLINIC | Age: 35
End: 2025-11-06
Payer: COMMERCIAL

## 2026-01-20 ENCOUNTER — APPOINTMENT (OUTPATIENT)
Dept: PRIMARY CARE | Facility: CLINIC | Age: 36
End: 2026-01-20
Payer: COMMERCIAL